# Patient Record
Sex: FEMALE | URBAN - METROPOLITAN AREA
[De-identification: names, ages, dates, MRNs, and addresses within clinical notes are randomized per-mention and may not be internally consistent; named-entity substitution may affect disease eponyms.]

---

## 2021-01-09 ENCOUNTER — EMERGENCY (EMERGENCY)
Facility: HOSPITAL | Age: 26
LOS: 1 days | Discharge: HOME | End: 2021-01-09
Attending: EMERGENCY MEDICINE | Admitting: EMERGENCY MEDICINE
Payer: COMMERCIAL

## 2021-01-09 VITALS
SYSTOLIC BLOOD PRESSURE: 149 MMHG | TEMPERATURE: 98 F | HEIGHT: 64 IN | OXYGEN SATURATION: 94 % | DIASTOLIC BLOOD PRESSURE: 98 MMHG | HEART RATE: 108 BPM | WEIGHT: 169.98 LBS | RESPIRATION RATE: 16 BRPM

## 2021-01-09 VITALS
DIASTOLIC BLOOD PRESSURE: 79 MMHG | HEART RATE: 81 BPM | SYSTOLIC BLOOD PRESSURE: 130 MMHG | OXYGEN SATURATION: 94 % | RESPIRATION RATE: 16 BRPM | TEMPERATURE: 98 F

## 2021-01-09 LAB
ALBUMIN SERPL ELPH-MCNC: 4.3 G/DL — SIGNIFICANT CHANGE UP (ref 3.3–5)
ALP SERPL-CCNC: 78 U/L — SIGNIFICANT CHANGE UP (ref 40–120)
ALT FLD-CCNC: 19 U/L — SIGNIFICANT CHANGE UP (ref 12–78)
ANION GAP SERPL CALC-SCNC: 9 MMOL/L — SIGNIFICANT CHANGE UP (ref 5–17)
APPEARANCE UR: CLEAR — SIGNIFICANT CHANGE UP
AST SERPL-CCNC: 15 U/L — SIGNIFICANT CHANGE UP (ref 15–37)
BASOPHILS # BLD AUTO: 0.05 K/UL — SIGNIFICANT CHANGE UP (ref 0–0.2)
BASOPHILS NFR BLD AUTO: 0.4 % — SIGNIFICANT CHANGE UP (ref 0–2)
BILIRUB SERPL-MCNC: 0.8 MG/DL — SIGNIFICANT CHANGE UP (ref 0.2–1.2)
BILIRUB UR-MCNC: NEGATIVE — SIGNIFICANT CHANGE UP
BUN SERPL-MCNC: 9 MG/DL — SIGNIFICANT CHANGE UP (ref 7–23)
CALCIUM SERPL-MCNC: 8.9 MG/DL — SIGNIFICANT CHANGE UP (ref 8.5–10.1)
CHLORIDE SERPL-SCNC: 108 MMOL/L — SIGNIFICANT CHANGE UP (ref 96–108)
CO2 SERPL-SCNC: 22 MMOL/L — SIGNIFICANT CHANGE UP (ref 22–31)
COLOR SPEC: YELLOW — SIGNIFICANT CHANGE UP
CREAT SERPL-MCNC: 0.86 MG/DL — SIGNIFICANT CHANGE UP (ref 0.5–1.3)
DIFF PNL FLD: NEGATIVE — SIGNIFICANT CHANGE UP
EOSINOPHIL # BLD AUTO: 0.02 K/UL — SIGNIFICANT CHANGE UP (ref 0–0.5)
EOSINOPHIL NFR BLD AUTO: 0.1 % — SIGNIFICANT CHANGE UP (ref 0–6)
GLUCOSE SERPL-MCNC: 111 MG/DL — HIGH (ref 70–99)
GLUCOSE UR QL: NEGATIVE — SIGNIFICANT CHANGE UP
HCG SERPL-ACNC: <1 MIU/ML — SIGNIFICANT CHANGE UP
HCG UR QL: NEGATIVE — SIGNIFICANT CHANGE UP
HCT VFR BLD CALC: 42.5 % — SIGNIFICANT CHANGE UP (ref 34.5–45)
HGB BLD-MCNC: 14.1 G/DL — SIGNIFICANT CHANGE UP (ref 11.5–15.5)
IMM GRANULOCYTES NFR BLD AUTO: 0.3 % — SIGNIFICANT CHANGE UP (ref 0–1.5)
KETONES UR-MCNC: ABNORMAL
LEUKOCYTE ESTERASE UR-ACNC: ABNORMAL
LIDOCAIN IGE QN: 172 U/L — SIGNIFICANT CHANGE UP (ref 73–393)
LYMPHOCYTES # BLD AUTO: 1.19 K/UL — SIGNIFICANT CHANGE UP (ref 1–3.3)
LYMPHOCYTES # BLD AUTO: 8.6 % — LOW (ref 13–44)
MAGNESIUM SERPL-MCNC: 2.1 MG/DL — SIGNIFICANT CHANGE UP (ref 1.6–2.6)
MCHC RBC-ENTMCNC: 28.3 PG — SIGNIFICANT CHANGE UP (ref 27–34)
MCHC RBC-ENTMCNC: 33.2 GM/DL — SIGNIFICANT CHANGE UP (ref 32–36)
MCV RBC AUTO: 85.3 FL — SIGNIFICANT CHANGE UP (ref 80–100)
MONOCYTES # BLD AUTO: 0.58 K/UL — SIGNIFICANT CHANGE UP (ref 0–0.9)
MONOCYTES NFR BLD AUTO: 4.2 % — SIGNIFICANT CHANGE UP (ref 2–14)
NEUTROPHILS # BLD AUTO: 11.96 K/UL — HIGH (ref 1.8–7.4)
NEUTROPHILS NFR BLD AUTO: 86.4 % — HIGH (ref 43–77)
NITRITE UR-MCNC: NEGATIVE — SIGNIFICANT CHANGE UP
NRBC # BLD: 0 /100 WBCS — SIGNIFICANT CHANGE UP (ref 0–0)
PH UR: 6 — SIGNIFICANT CHANGE UP (ref 5–8)
PLATELET # BLD AUTO: 321 K/UL — SIGNIFICANT CHANGE UP (ref 150–400)
POTASSIUM SERPL-MCNC: 3.6 MMOL/L — SIGNIFICANT CHANGE UP (ref 3.5–5.3)
POTASSIUM SERPL-SCNC: 3.6 MMOL/L — SIGNIFICANT CHANGE UP (ref 3.5–5.3)
PROT SERPL-MCNC: 8.5 G/DL — HIGH (ref 6–8.3)
PROT UR-MCNC: NEGATIVE — SIGNIFICANT CHANGE UP
RBC # BLD: 4.98 M/UL — SIGNIFICANT CHANGE UP (ref 3.8–5.2)
RBC # FLD: 12.9 % — SIGNIFICANT CHANGE UP (ref 10.3–14.5)
SARS-COV-2 RNA SPEC QL NAA+PROBE: SIGNIFICANT CHANGE UP
SODIUM SERPL-SCNC: 139 MMOL/L — SIGNIFICANT CHANGE UP (ref 135–145)
SP GR SPEC: 1.01 — SIGNIFICANT CHANGE UP (ref 1.01–1.02)
UROBILINOGEN FLD QL: NEGATIVE — SIGNIFICANT CHANGE UP
WBC # BLD: 13.84 K/UL — HIGH (ref 3.8–10.5)
WBC # FLD AUTO: 13.84 K/UL — HIGH (ref 3.8–10.5)

## 2021-01-09 PROCEDURE — 83690 ASSAY OF LIPASE: CPT

## 2021-01-09 PROCEDURE — 76705 ECHO EXAM OF ABDOMEN: CPT

## 2021-01-09 PROCEDURE — U0005: CPT

## 2021-01-09 PROCEDURE — 83735 ASSAY OF MAGNESIUM: CPT

## 2021-01-09 PROCEDURE — 81025 URINE PREGNANCY TEST: CPT

## 2021-01-09 PROCEDURE — 99284 EMERGENCY DEPT VISIT MOD MDM: CPT | Mod: 25

## 2021-01-09 PROCEDURE — 84702 CHORIONIC GONADOTROPIN TEST: CPT

## 2021-01-09 PROCEDURE — 96374 THER/PROPH/DIAG INJ IV PUSH: CPT

## 2021-01-09 PROCEDURE — 93010 ELECTROCARDIOGRAM REPORT: CPT

## 2021-01-09 PROCEDURE — 76705 ECHO EXAM OF ABDOMEN: CPT | Mod: 26

## 2021-01-09 PROCEDURE — 80053 COMPREHEN METABOLIC PANEL: CPT

## 2021-01-09 PROCEDURE — 93005 ELECTROCARDIOGRAM TRACING: CPT

## 2021-01-09 PROCEDURE — 85025 COMPLETE CBC W/AUTO DIFF WBC: CPT

## 2021-01-09 PROCEDURE — 81001 URINALYSIS AUTO W/SCOPE: CPT

## 2021-01-09 PROCEDURE — U0003: CPT

## 2021-01-09 PROCEDURE — 36415 COLL VENOUS BLD VENIPUNCTURE: CPT

## 2021-01-09 RX ORDER — ONDANSETRON 8 MG/1
1 TABLET, FILM COATED ORAL
Qty: 15 | Refills: 0
Start: 2021-01-09 | End: 2021-01-13

## 2021-01-09 RX ORDER — SODIUM CHLORIDE 9 MG/ML
1000 INJECTION INTRAMUSCULAR; INTRAVENOUS; SUBCUTANEOUS ONCE
Refills: 0 | Status: COMPLETED | OUTPATIENT
Start: 2021-01-09 | End: 2021-01-09

## 2021-01-09 RX ORDER — ONDANSETRON 8 MG/1
4 TABLET, FILM COATED ORAL ONCE
Refills: 0 | Status: COMPLETED | OUTPATIENT
Start: 2021-01-09 | End: 2021-01-09

## 2021-01-09 RX ADMIN — ONDANSETRON 4 MILLIGRAM(S): 8 TABLET, FILM COATED ORAL at 14:06

## 2021-01-09 RX ADMIN — SODIUM CHLORIDE 1000 MILLILITER(S): 9 INJECTION INTRAMUSCULAR; INTRAVENOUS; SUBCUTANEOUS at 14:06

## 2021-01-09 NOTE — ED ADULT TRIAGE NOTE - CHIEF COMPLAINT QUOTE
pt ambulatory to ED c/o abdominal pain  pt states she woke up around 3am with pain and nausea, had one episode of diarrhea  pt has not had menstrual period in 2 months  pt states she has had a lot of anxiety recently, just yesterday was at a clinic and they gave her a medication for anxiety but she had to crush it up to take it and then she woke up with abdominal pain

## 2021-01-09 NOTE — ED PROVIDER NOTE - PROGRESS NOTE DETAILS
patient feeling better, denies urinary frequency or burning, abdomen soft, agrees to f/u with PMD on Monday, understands to return to ER for worsneing of symptoms

## 2021-01-09 NOTE — ED PROVIDER NOTE - NSFOLLOWUPINSTRUCTIONS_ED_ALL_ED_FT
Nausea is the feeling that you have an upset stomach or that you are about to vomit. Nausea on its own is not usually a serious concern, but it may be an early sign of a more serious medical problem. As nausea gets worse, it can lead to vomiting. If vomiting develops, or if you are not able to drink enough fluids, you are at risk of becoming dehydrated. Dehydration can make you tired and thirsty, cause you to have a dry mouth, and decrease how often you urinate. Older adults and people with other diseases or a weak disease-fighting system (immune system) are at higher risk for dehydration. The main goals of treating your nausea are:  •To relieve your nausea.      •To limit repeated nausea episodes.      •To prevent vomiting and dehydration.        Follow these instructions at home:    Watch your symptoms for any changes. Tell your health care provider about them. Follow these instructions as told by your health care provider.      Eating and drinking                   •Take an oral rehydration solution (ORS). This is a drink that is sold at pharmacies and retail stores.      •Drink clear fluids slowly and in small amounts as you are able. Clear fluids include water, ice chips, low-calorie sports drinks, and fruit juice that has water added (diluted fruit juice).      •Eat bland, easy-to-digest foods in small amounts as you are able. These foods include bananas, applesauce, rice, lean meats, toast, and crackers.      •Avoid drinking fluids that contain a lot of sugar or caffeine, such as energy drinks, sports drinks, and soda.      •Avoid alcohol.      •Avoid spicy or fatty foods.      General instructions     •Take over-the-counter and prescription medicines only as told by your health care provider.      •Rest at home while you recover.      •Drink enough fluid to keep your urine pale yellow.      •Breathe slowly and deeply when you feel nauseous.      •Avoid smelling things that have strong odors.      •Wash your hands often using soap and water. If soap and water are not available, use hand .      •Make sure that all people in your household wash their hands well and often.      •Keep all follow-up visits as told by your health care provider. This is important.        Contact a health care provider if:    •Your nausea gets worse.      •Your nausea does not go away after two days.      •You vomit.      •You cannot drink fluids without vomiting.    •You have any of the following:  •New symptoms.      •A fever.      •A headache.      •Muscle cramps.      •A rash.      •Pain while urinating.        •You feel light-headed or dizzy.        Get help right away if:    •You have pain in your chest, neck, arm, or jaw.      •You feel extremely weak or you faint.      •You have vomit that is bright red or looks like coffee grounds.      •You have bloody or black stools or stools that look like tar.      •You have a severe headache, a stiff neck, or both.      •You have severe pain, cramping, or bloating in your abdomen.      •You have difficulty breathing or are breathing very quickly.      •Your heart is beating very quickly.      •Your skin feels cold and clammy.      •You feel confused.    •You have signs of dehydration, such as:  •Dark urine, very little urine, or no urine.      •Cracked lips.      •Dry mouth.      •Sunken eyes.      •Sleepiness.      •Weakness.        These symptoms may represent a serious problem that is an emergency. Do not wait to see if the symptoms will go away. Get medical help right away. Call your local emergency services (911 in the [place] Do not drive yourself to the hospital.       Summary    •Nausea is the feeling that you have an upset stomach or that you are about to vomit. Nausea on its own is not usually a serious concern, but it may be an early sign of a more serious medical problem.      •If vomiting develops, or if you are not able to drink enough fluids, you are at risk of becoming dehydrated.      •Follow recommendations for eating and drinking and take over-the-counter and prescription medicines only as told by your health care provider.      •Contact a health care provider right away if your symptoms worsen or you have new symptoms.      •Keep all follow-up visits as told by your health care provider. This is important.      This information is not intended to replace advice given to you by your health care provider. Make sure you discuss any questions you have with your health care provider.      Document Revised: 05/28/2019 Document Reviewed: 05/28/2019    [employer] Patient Education © 2020 [employer]

## 2021-01-09 NOTE — ED PROVIDER NOTE - OBJECTIVE STATEMENT
25 female presents to ER states last week she had a "cold", sore throat, congestion, dough, low grade fever which resolved, states she has been anxious, worrying a lot, states her father and younger brother were exposed to covid and tested (-), patient has been having nausea, decreased appetite, went to PMD urgent care yesterday and was prescribed inhaler and escitalopram which she started yesterday. Patient LMP 2 months ago, states she is not pregnant, states she has irregular periods at times.

## 2021-01-09 NOTE — ED ADULT NURSE NOTE - OBJECTIVE STATEMENT
Received pt in bed alert and oriented x4.  C/O abdominal discomfort with nausea and diarrhea x 1 episode since 3 am.  Pt stated shes been recently suffering from anxiety.  pt also stated she has not had her period in 2 months.

## 2021-01-09 NOTE — ED PROVIDER NOTE - PATIENT PORTAL LINK FT
You can access the FollowMyHealth Patient Portal offered by [facility] by registering at the following website: [email or web address]. By joining [employer]’s FollowMyHealth portal, you will also be able to view your health information using other applications (apps) compatible with our system.

## 2021-01-09 NOTE — ED PROVIDER NOTE - CARE PROVIDER_API CALL
DON BLOUNT  Internal Medicine  [address] [place] [id number]  Phone: [phone]  Fax: [phone]  Follow Up Time:

## 2021-01-09 NOTE — ED PROVIDER NOTE - AXIS
You met with Dr Hughes today at the request of Dr Ochoa in regards to your possible vascular malformation seen on your MRI.  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 He also talked about possible AVM (arteriorvenous malformation) or a fistula (abnormal connection between an artery and vein).  He is unsure if this is a DVA or some type of malformation, so  he recommends a Cerebral Angiogram to further evaluate the vessels of your head and neck, and to give you an answer as to what this may be.    Normal

## 2022-01-20 NOTE — ED ADULT TRIAGE NOTE - HEIGHT IN CM
Bere was advised of today's exam findings.  Fill glasses prescription  Allow 2 weeks to adapt to change in glasses  Wear glasses full time  Copy of glasses Rx provided today.    Return in 1 year for eye exam, or sooner if needed.    The effects of the dilating drops last for 4- 6 hours.  You will be more sensitive to light and vision will be blurry up close.  Mydriatic sunglasses were given if needed.    Mario Ayala O.D.  [facility]  [address] [place] [address]. [place]  [place]  [id number]    [phone]     [phone]
